# Patient Record
Sex: FEMALE | Race: WHITE | NOT HISPANIC OR LATINO | URBAN - METROPOLITAN AREA
[De-identification: names, ages, dates, MRNs, and addresses within clinical notes are randomized per-mention and may not be internally consistent; named-entity substitution may affect disease eponyms.]

---

## 2017-05-15 ENCOUNTER — OUTPATIENT (OUTPATIENT)
Dept: OUTPATIENT SERVICES | Facility: HOSPITAL | Age: 52
LOS: 1 days | Discharge: HOME | End: 2017-05-15

## 2017-06-28 DIAGNOSIS — R92.8 OTHER ABNORMAL AND INCONCLUSIVE FINDINGS ON DIAGNOSTIC IMAGING OF BREAST: ICD-10-CM

## 2017-07-25 ENCOUNTER — OUTPATIENT (OUTPATIENT)
Dept: OUTPATIENT SERVICES | Facility: HOSPITAL | Age: 52
LOS: 1 days | Discharge: HOME | End: 2017-07-25

## 2017-07-25 DIAGNOSIS — Z00.00 ENCOUNTER FOR GENERAL ADULT MEDICAL EXAMINATION WITHOUT ABNORMAL FINDINGS: ICD-10-CM

## 2018-02-03 ENCOUNTER — OUTPATIENT (OUTPATIENT)
Dept: OUTPATIENT SERVICES | Facility: HOSPITAL | Age: 53
LOS: 1 days | Discharge: HOME | End: 2018-02-03

## 2018-02-03 DIAGNOSIS — Z79.899 OTHER LONG TERM (CURRENT) DRUG THERAPY: ICD-10-CM

## 2018-08-01 ENCOUNTER — OUTPATIENT (OUTPATIENT)
Dept: OUTPATIENT SERVICES | Facility: HOSPITAL | Age: 53
LOS: 1 days | Discharge: HOME | End: 2018-08-01

## 2018-08-01 DIAGNOSIS — Z12.31 ENCOUNTER FOR SCREENING MAMMOGRAM FOR MALIGNANT NEOPLASM OF BREAST: ICD-10-CM

## 2018-08-04 ENCOUNTER — OUTPATIENT (OUTPATIENT)
Dept: OUTPATIENT SERVICES | Facility: HOSPITAL | Age: 53
LOS: 1 days | Discharge: HOME | End: 2018-08-04

## 2018-08-04 DIAGNOSIS — E11.9 TYPE 2 DIABETES MELLITUS WITHOUT COMPLICATIONS: ICD-10-CM

## 2018-08-04 DIAGNOSIS — N39.0 URINARY TRACT INFECTION, SITE NOT SPECIFIED: ICD-10-CM

## 2018-08-04 DIAGNOSIS — E53.8 DEFICIENCY OF OTHER SPECIFIED B GROUP VITAMINS: ICD-10-CM

## 2018-08-04 DIAGNOSIS — D50.9 IRON DEFICIENCY ANEMIA, UNSPECIFIED: ICD-10-CM

## 2018-08-04 DIAGNOSIS — D64.9 ANEMIA, UNSPECIFIED: ICD-10-CM

## 2018-08-04 DIAGNOSIS — E03.9 HYPOTHYROIDISM, UNSPECIFIED: ICD-10-CM

## 2018-08-04 DIAGNOSIS — E78.5 HYPERLIPIDEMIA, UNSPECIFIED: ICD-10-CM

## 2018-08-04 DIAGNOSIS — Z00.00 ENCOUNTER FOR GENERAL ADULT MEDICAL EXAMINATION WITHOUT ABNORMAL FINDINGS: ICD-10-CM

## 2018-08-04 DIAGNOSIS — E55.9 VITAMIN D DEFICIENCY, UNSPECIFIED: ICD-10-CM

## 2018-08-04 DIAGNOSIS — G40.909 EPILEPSY, UNSPECIFIED, NOT INTRACTABLE, WITHOUT STATUS EPILEPTICUS: ICD-10-CM

## 2018-08-18 ENCOUNTER — OUTPATIENT (OUTPATIENT)
Dept: OUTPATIENT SERVICES | Facility: HOSPITAL | Age: 53
LOS: 1 days | Discharge: HOME | End: 2018-08-18

## 2018-08-18 DIAGNOSIS — G40.909 EPILEPSY, UNSPECIFIED, NOT INTRACTABLE, WITHOUT STATUS EPILEPTICUS: ICD-10-CM

## 2018-08-18 NOTE — CHART NOTE - NSCHARTNOTEFT_GEN_A_CORE
Called by stat lab- phenytoin level 30.8  I spoke with Dr. Huggins who will discuss the findings with Dr. Cordova.

## 2019-01-26 ENCOUNTER — OUTPATIENT (OUTPATIENT)
Dept: OUTPATIENT SERVICES | Facility: HOSPITAL | Age: 54
LOS: 1 days | Discharge: HOME | End: 2019-01-26

## 2019-01-26 DIAGNOSIS — G40.909 EPILEPSY, UNSPECIFIED, NOT INTRACTABLE, WITHOUT STATUS EPILEPTICUS: ICD-10-CM

## 2019-07-12 ENCOUNTER — OUTPATIENT (OUTPATIENT)
Dept: OUTPATIENT SERVICES | Facility: HOSPITAL | Age: 54
LOS: 1 days | Discharge: HOME | End: 2019-07-12

## 2019-07-12 ENCOUNTER — LABORATORY RESULT (OUTPATIENT)
Age: 54
End: 2019-07-12

## 2019-07-12 DIAGNOSIS — G40.909 EPILEPSY, UNSPECIFIED, NOT INTRACTABLE, WITHOUT STATUS EPILEPTICUS: ICD-10-CM

## 2019-07-15 ENCOUNTER — APPOINTMENT (OUTPATIENT)
Dept: NEUROLOGY | Facility: CLINIC | Age: 54
End: 2019-07-15
Payer: COMMERCIAL

## 2019-07-15 VITALS
SYSTOLIC BLOOD PRESSURE: 118 MMHG | HEART RATE: 73 BPM | DIASTOLIC BLOOD PRESSURE: 69 MMHG | TEMPERATURE: 98.6 F | HEIGHT: 68 IN | BODY MASS INDEX: 20.46 KG/M2 | OXYGEN SATURATION: 99 % | WEIGHT: 135 LBS

## 2019-07-15 PROCEDURE — 99214 OFFICE O/P EST MOD 30 MIN: CPT

## 2019-07-15 NOTE — PHYSICAL EXAM
[FreeTextEntry1] : She is awake alert and oriented x3 her executive behavior is good she does have a tendency to go from one subject to another and occasionally has a slight difficulties with processing.\par She also occasionally have difficulty retrieving words.\par Cranial nerve exam is normal motor exam is normal\par Cerebellar exam is normal Romberg is negative

## 2019-07-15 NOTE — HISTORY OF PRESENT ILLNESS
[FreeTextEntry1] : Virginia he is here for followup. She was seen by me first in 2011 after she was admitted to the hospital for a period of confusion on that date and also had a witnessed generalized tonic-clonic seizure.\par She was diagnosed with epilepsy around age 11 and on and phenobarbital and Dilantin she did well for about 30 years and then her medications are being tapered off in 2011 when she had his episodes. She did have a V-EEG monitoring done that showed a right hemispheric focus she was just started on her medications from the very beginning she categorically was supposed to switch her medications and she is taking phenobarbital and Dilantin since then. Her Dilantin level despite the low dose is always about therapeutic range however she is tolerating that well without any symptoms.\par Since he is starting her medications she did not have any oth her level of energy is also good er episodes that they know of. She goes to work she drives and she says her mood and sleep are

## 2019-07-15 NOTE — ASSESSMENT
[FreeTextEntry1] : Virginia She is doing very well in regard to her seizures she is quite functional we will continue the current doses at this point she does not want to switch her medications. Her cholesterol is on the high side she is trying to modify her eating habits as well I will see her in followup.he is here for followup with the diagnosis of right hemispheric partial epilepsy since age 11

## 2019-09-09 ENCOUNTER — OUTPATIENT (OUTPATIENT)
Dept: OUTPATIENT SERVICES | Facility: HOSPITAL | Age: 54
LOS: 1 days | Discharge: HOME | End: 2019-09-09
Payer: COMMERCIAL

## 2019-09-09 DIAGNOSIS — Z12.31 ENCOUNTER FOR SCREENING MAMMOGRAM FOR MALIGNANT NEOPLASM OF BREAST: ICD-10-CM

## 2019-09-09 PROCEDURE — 77067 SCR MAMMO BI INCL CAD: CPT | Mod: 26

## 2019-09-09 PROCEDURE — 77063 BREAST TOMOSYNTHESIS BI: CPT | Mod: 26

## 2020-01-03 ENCOUNTER — RX RENEWAL (OUTPATIENT)
Age: 55
End: 2020-01-03

## 2020-01-06 ENCOUNTER — APPOINTMENT (OUTPATIENT)
Dept: NEUROLOGY | Facility: CLINIC | Age: 55
End: 2020-01-06
Payer: COMMERCIAL

## 2020-01-06 VITALS
WEIGHT: 140 LBS | DIASTOLIC BLOOD PRESSURE: 80 MMHG | SYSTOLIC BLOOD PRESSURE: 125 MMHG | TEMPERATURE: 96.3 F | HEART RATE: 75 BPM | BODY MASS INDEX: 20.73 KG/M2 | HEIGHT: 69 IN | OXYGEN SATURATION: 99 %

## 2020-01-06 PROCEDURE — 99213 OFFICE O/P EST LOW 20 MIN: CPT

## 2020-01-06 NOTE — ASSESSMENT
[FreeTextEntry1] : Virginia is here for followup she has been doing very well with her seizures. She carries a diagnosis of right hemispheric partial epilepsy. She is currently on Dilantin and phenobarbital. She always has persistent high Dilantin level as she tolerates well. She does not want to change her medications we have discussed that multiple times and we will continue at the current dose as and I will do a blood level before next visit

## 2020-01-06 NOTE — HISTORY OF PRESENT ILLNESS
[FreeTextEntry1] : virginia he is here for followup. She has been doing very well in regard to her seizure. She had did not have any episode suggestive of seizure. She is also quite happy that she made a decision to lose some weight. She says it is mainly because of the bending that she has to go.\par Overall she is quite happy functional and going to work regularly. Her sleep pattern is good and she describes her mood to be good as well.\par She has her regular followups with her primary physician. She is supposed to do a screening blood tests before next visit to include lipid profile.\par Her mammogram that was done recently was good.\par She describes her sleep pattern also to be good and waking refreshed in the morning. She says her level of energy his good. There is no headache.\par Her spine and knees are good location and in cold weather she feels a slightly achy in both knees.\par She did not have any fall.

## 2020-01-06 NOTE — PHYSICAL EXAM
[FreeTextEntry1] : She is awake alert oriented x3. Follows 3-4 step commands. Crosses the midline well.\par She is humorous and in a good mood.\par Her language is intact and memory recall is 3 out of 3.\par Cranial nerve exam is normal\par The motor exam is normal\par Cerebellar exam were normal\par Carotid exam shows no bruit\par Cardiac rhythm is normal sinus

## 2020-08-01 LAB
PHENOBARB SERPL QL: 8 UG/ML
PHENYTOIN SERPL QL: 28.6 UG/ML

## 2020-08-06 ENCOUNTER — APPOINTMENT (OUTPATIENT)
Dept: NEUROLOGY | Facility: CLINIC | Age: 55
End: 2020-08-06
Payer: COMMERCIAL

## 2020-08-06 VITALS
WEIGHT: 140 LBS | DIASTOLIC BLOOD PRESSURE: 73 MMHG | OXYGEN SATURATION: 100 % | TEMPERATURE: 97.9 F | SYSTOLIC BLOOD PRESSURE: 118 MMHG | HEIGHT: 69 IN | HEART RATE: 89 BPM | BODY MASS INDEX: 20.73 KG/M2

## 2020-08-06 LAB — PHENYTOIN FREE SERPL-MCNC: 2 MG/L

## 2020-08-06 PROCEDURE — 99214 OFFICE O/P EST MOD 30 MIN: CPT

## 2020-08-07 NOTE — PHYSICAL EXAM
[FreeTextEntry1] : A/A/Ox3\par follows 4 step commands\par In good mood\par appears slightly tired\par mildly slow with the processing and psychomotor activities\par CN- normal\par Motor----normal\par Gait--stable\par Romberg is negative

## 2020-08-07 NOTE — ASSESSMENT
[FreeTextEntry1] : Right hemispheric partial epilepsy\par Doing very well\par does not want to come off the dilantin and Phenobarb\par will continue the meds \par She will have F/U with PMD to check on her borderline high cholestrol

## 2020-08-07 NOTE — HISTORY OF PRESENT ILLNESS
[FreeTextEntry1] : Virginia is here for F/U. she is doing very well with her seizures . no events  suggestive for seizure.\par No nocturnal events. She is still not back to work.\par She says her mood  is good now , however she has had a period of feeling down and lonely and not having a reasonable human contact.\par Now made some changes and is doing better. While at home gained a good amount of weight.\par Last week decided to work on her weight. she appears very motivated. \par No other issues. Denies having back or neck pain\par No Ha. No fall\par Scheduled to see her PMD. she will do a cmplete blood test before that\par She has had her blood test done the Pb. is 8 and Dilantin level is 28\par

## 2020-09-22 ENCOUNTER — OUTPATIENT (OUTPATIENT)
Dept: OUTPATIENT SERVICES | Facility: HOSPITAL | Age: 55
LOS: 1 days | Discharge: HOME | End: 2020-09-22
Payer: COMMERCIAL

## 2020-09-22 DIAGNOSIS — Z12.31 ENCOUNTER FOR SCREENING MAMMOGRAM FOR MALIGNANT NEOPLASM OF BREAST: ICD-10-CM

## 2020-09-22 PROCEDURE — 77063 BREAST TOMOSYNTHESIS BI: CPT | Mod: 26

## 2020-09-22 PROCEDURE — 77067 SCR MAMMO BI INCL CAD: CPT | Mod: 26

## 2021-02-08 ENCOUNTER — APPOINTMENT (OUTPATIENT)
Dept: NEUROLOGY | Facility: CLINIC | Age: 56
End: 2021-02-08
Payer: COMMERCIAL

## 2021-02-08 VITALS
HEIGHT: 69 IN | OXYGEN SATURATION: 98 % | HEART RATE: 82 BPM | BODY MASS INDEX: 26.66 KG/M2 | TEMPERATURE: 96.3 F | DIASTOLIC BLOOD PRESSURE: 70 MMHG | WEIGHT: 180 LBS | SYSTOLIC BLOOD PRESSURE: 129 MMHG

## 2021-02-08 PROCEDURE — 99072 ADDL SUPL MATRL&STAF TM PHE: CPT

## 2021-02-08 PROCEDURE — 99213 OFFICE O/P EST LOW 20 MIN: CPT

## 2021-02-08 NOTE — PHYSICAL EXAM
[FreeTextEntry1] : A/A/OX3\par follows 4 step commands\par Crosses the midline well\par slight sleepy and tired\par slightly flat affect\par clearly gained weight\par normal CN\par no drift\par no dysmetria\par slight decreases dexterity and fixation of the left\par stable gait\par

## 2021-02-08 NOTE — HISTORY OF PRESENT ILLNESS
[FreeTextEntry1] : Virginia is here for the F/U. Denies having had any seizures or events suspicious for seizure.\par She looks slightly tired. she says this is because she was taking care of the snow in the drive way.. She gained about 40 pounds since last visit. she says she sleeps good. not too much??\par she denies having had neck or back pain except yesterday working outside to clean up the snow.\par Does not report having issues with her mood and memory\par No radiating pain to the arm or legs\par No vertigo\par no Ballance issues\par Did not see her PMD . is scheduled to see him in few weeks\par No blood test\par Is working from home\par planning to retire

## 2021-02-08 NOTE — ASSESSMENT
[FreeTextEntry1] : Right hemispheric partial epilepsy\par \par she is still  not eager to change her dilantin\par will do levels and also Vit D level\par will do bone density scan\par \par

## 2021-03-22 VITALS — SYSTOLIC BLOOD PRESSURE: 134 MMHG | WEIGHT: 170 LBS | BODY MASS INDEX: 25.1 KG/M2 | DIASTOLIC BLOOD PRESSURE: 76 MMHG

## 2021-08-09 ENCOUNTER — APPOINTMENT (OUTPATIENT)
Dept: NEUROLOGY | Facility: CLINIC | Age: 56
End: 2021-08-09
Payer: COMMERCIAL

## 2021-08-09 VITALS
WEIGHT: 168.25 LBS | TEMPERATURE: 97.9 F | OXYGEN SATURATION: 97 % | HEART RATE: 82 BPM | HEIGHT: 69 IN | DIASTOLIC BLOOD PRESSURE: 80 MMHG | BODY MASS INDEX: 24.92 KG/M2 | SYSTOLIC BLOOD PRESSURE: 125 MMHG

## 2021-08-09 PROCEDURE — 99213 OFFICE O/P EST LOW 20 MIN: CPT

## 2021-08-10 NOTE — PHYSICAL EXAM
[General Appearance - Alert] : alert [General Appearance - In No Acute Distress] : in no acute distress [General Appearance - Well Nourished] : well nourished [Oriented To Time, Place, And Person] : oriented to person, place, and time [Cranial Nerves Optic (II)] : visual acuity intact bilaterally,  visual fields full to confrontation, pupils equal round and reactive to light [Cranial Nerves Oculomotor (III)] : extraocular motion intact [Cranial Nerves Trigeminal (V)] : facial sensation intact symmetrically [Cranial Nerves Facial (VII)] : face symmetrical [Cranial Nerves Vestibulocochlear (VIII)] : hearing was intact bilaterally [Cranial Nerves Glossopharyngeal (IX)] : tongue and palate midline [Cranial Nerves Accessory (XI - Cranial And Spinal)] : head turning and shoulder shrug symmetric [Cranial Nerves Hypoglossal (XII)] : there was no tongue deviation with protrusion [Involuntary Movements] : no involuntary movements were seen [No Muscle Atrophy] : normal bulk in all four extremities [2+] : Ankle jerk left 2+ [Motor Strength Upper Extremities Bilaterally] : strength was normal in both upper extremities [Motor Strength Lower Extremities Bilaterally] : strength was normal in both lower extremities [Tremor] : no tremor present

## 2021-08-10 NOTE — HISTORY OF PRESENT ILLNESS
[FreeTextEntry1] : Virginia is a 56 year old female with history of right hemispheric partial epilepsy.\par Pt is doing well, remains seizure free on Phenobarbital and Phenytoin. Did not get a chance to have blood levels check. Has not seen her PMD for over a year. Trying to exercise and watching her diet. Lost 2 lbs since last visit.\par Pt is still working mostly remotely. Goes in to the office one week a month. Denies any complaints. Sleeping well. Tolerating her medications.

## 2021-08-28 ENCOUNTER — LABORATORY RESULT (OUTPATIENT)
Age: 56
End: 2021-08-28

## 2021-08-31 ENCOUNTER — TRANSCRIPTION ENCOUNTER (OUTPATIENT)
Age: 56
End: 2021-08-31

## 2021-09-23 NOTE — DISCUSSION/SUMMARY
Decrease your Lexapro to 10 mg for the next 7 days and then stop taking it.  Start taking your new medication Remeron tonight.  It may cause some increased sedation so make sure there is someone with you at night to help care for your baby.      [facility], [facility]      Celeste EnamoradoReina) Mirna, MS, LPC, ATR, LMFT, PMH-C   Reina Bradley, MS, LPC, ATR, LMFT, PMH-C is a Couples and Family Therapist who helps moms and  dads suffering from  mood disorders. She holds the  Mental Health Certification  (PMH-C) credential issued by [employer], and specializes in effective,  evidence-based treatments including CBT, EMDR, and SFBT. She takes a collaborative approach to  healing and relationships. Her practice focuses on solutions and, as a result, individuals and their families  often see improvements as quickly as 6 to 8 sessions.  In addition, Reina offers a holistic approach to mental health through the use of mindfulness, creativity,  couple's support, collaboration with other providers, and family involvement as desired.  The set of skills that sets Reina apart and compliments the evidence based treatments she provides, is her  background in Art Therapy. This creative approach is not mandatory, and is available when it's a good fit  for the client.  Reina serves on the board of directors as  for [employer] -  [place] Chapter, member of [employer] and the American [employer]  Therapy Association, has an Attachment Certificate, has training in EMDR for  Trauma, Loss,  and Recovery, and received her PMH-C in 2019. She is in private practice at [facility], located in  [place], [place], and can be found on the web at [email or web address] , or by phone at  [phone].    Best Way to Make an Appointment: Email [email or web address] or Phone call [phone]). She will get  [Safety Recommendations] : The patient was advised in regards to the risk of seizures and general seizure safety recommendations including not to be bathing alone, climbing to high places and operating heavy machinery. [Compliance with Medications] : The importance of compliance with medications was reinforced. back to moms within 1 business day (if the message sounds urgent she will call on the weekend). She does a very brief assessment over the phone, asking about previous therapy and to encourage moms and give admiration for calling.   [email or web address]     Location: Therapies [address] [address]    [facility]      Brittany Garcia, YAW, TIMOTHY, ICS   TIMOTHY Barrera LPC, ICS received her Master's Degree from [employer] and has been practicing in the field for 7 years. She has been serving as the Clinical Director for a local substance abuse treatment program for 3 years, and also practices in [place], [place] treating patients with Anxiety Disorders, Mood Disorders, Eating Disorders, and  Mood and Anxiety Disorders using evidence-based modalities, such as CBT and DBT. She also offers family and marital education and therapy. She is also certified in  Mood and Anxiety Disorders (new credential PMH-C). Mothers can bring their child(maribell) with to sessions. She provides Telehealth sessions to allow patients to seek treatment in the comfort of their own environment as an option for treatment.  Her personal and familial experiences with infertility, PMADS, and parenting, along with professional experience and training, have served Brittany with a passion to help empower and guide others to overcome barriers and achieve goals.    Best Way to Make an Appointment: Email: [email or web address] or call [phone]    Location: [address] [place] [address]    Winnie Fraser LCSW, ERIK Zhou has been a clinician for 10 years. She has experience in a broad spectrum of specialties including mood disorders, anxiety, PTSD, family counseling and substance use disorder.  Currently she is practicing in an outpatient setting. She began working with  patients after having her own child. She is trained in skilled-based  [Medication Side Effects] : High frequency and serious potential medication adverse effects were reviewed with the patient, including but not exclusive to psychiatric effects.  Information sheets on medication side effects were made available to the patient in our clinic.  The patient or advocate agrees to notify us for any concerns. methods and traditional talk therapy and emphasizes the importance of psychoeducation, validation and working to address difficult patterns of thinking and behavior. Overall, she is working to broaden access and increase awareness of PMAD within her health system. She is currently pursing PSI certification.    Best Way to Make an Appointment: Calling [phone] (there is a long wait time to receive a call back). [facility] provider can put a referral in EPIC in addition to the pt. calling the number. Must have a [facility] provide of some sort to see Winnie,     Location: [facility]: [address]  [place] [id number]    Dr. Amanda Anglin - [facility]      Amanda Anglin, PhD   Currently not accepting new patients.   Dr. Anglin provides psychotherapy for preconception, pregnancy and up to 6-months postpartum. She does donor therapy before conception with donor egg/sperm. She will see patients before IVF treatment. Dr. Anglin is a psychologist and cannot prescribe or manage medications. She providers a more traditional therapy. Telehealth and currently seeing patients in person one day a week.   Dr. Anglin also runs an 8-week mindfulness group therapy at the [facility]. To join the group, a patient must meet with Dr. Anglin once for an evaluation. The group is closed and is billed to insurance.       Best Way to Make an Appointment: Patient should call the main line or leave a voicemail - [phone].     Location: [address] [place] [address]. [place] [address]    [facility], [facility]      IVELISSE Wood MA, LPC Licensed Mental Health Counselor   Holistic Psychotherapy with IVELISSE Wood MA, YAW Licensed Mental Health Counselor    Deanna provides individual therapy, couples therapy, family therapy and group therapy.    Deanna applies a Holistic understanding of mental health to her practice. She  [Sleep Hygiene/Sleep Disruption Risks] : Sleep hygiene and the risks of sleep disruption were discussed. doesn't separate the mind and body in the usual way; but instead, she generally assumes that body and mind are dynamically interconnected and that both directly influence each other.     When appropriate, she will provide basic information on nutrition, exercise, stress management, and social and environmental determinants to health and disease.  She also counsels the person to help them deal with the emotional and mental state they are experiencing.    She provides consultations so that you can become more efficient and effective in the relationship or work situation by having a better understanding of the underlying psychological issues.    She offers a 10 minute free phone consultation to discuss services and understand what the patients needs are. She also offers a 10 minute online consultation. Some people like to experience the online format to see what it's like.     Deanna provides regular ongoing as well as a one-time supervision for mental health professionals.  Clinical Supervision provides an opportunity to review your work with clients as well as your own personal and professional growth.  Currently offering telehealth    Best Way to Make an Appointment: Phone call [phone] or email [email or web address]    Location: [address] [place] [address]  [id number]      Online Support/Peer Support   Weekly Online Support Meetings    (Variety of online support and peer support options)  http://www.postpartum.net/get-help/psi-online-support-meetings/  [employer] offers online support for  mood and anxiety disorders. Check their website for times and topics.   Join the meeting from your complete, tablet or smartphone. Get comfortable, grab a coffee cup. Listen and share your story as a mom. Meeting also offered in Bulgarian.   [email or web address]/[email or web address]  Join the moderated PSI Closed Facebook group to find support and encouragement from other parents, who may be going  [FreeTextEntry1] : Virginia is a 56 year old female with history of right hemispheric partial epilepsy doing well on current medications and does not want to switch,\par Plan:\par - WIll continue same doses of mecidations for no\par - Rx given for blood work\par - Bone density scan\par - Follow up in 6-7 months\par \par Case discussed with Dr. Cordova\par \par Ayala Fraser, DNP, ACNP-BC through similar experiences.    Best Way to Make an Appointment: If you would like to attend  online meetings for the first time, please create an account. It takes just a minute to complete and is free: [email or web address]    Location:   ALL - Online    PSI [place] Chapter (if patient needs to contact PSI by telephone)  [place] is the [facility] [place] [facility] chapter of [employer]partum [employer] (PSI), the world’s leading non-profit organization dedicated to helping those suffering from  mood disorders, the most common complication of childbirth. Our mission is to promote awareness, education, prevention, and treatment of  mental health issues affecting mothers, their families, and support systems in all areas of [place].  Best way to make an appointment [email or web address]  [phone] English and Portuguese   Location: [facility]     Moms Mental Health Initiative ([facility])        Moms Mental Health Initiative (momsmentalhealthmke.org) is a non-profit organization serving moms in the [facility] area and surrounding areas. They are dedicated to helping moms navigate  mood and anxiety disorders (PMADs) by sharing information, connecting them to resources and providing peer-driven support. They are the only organization in [place] that:  •      Offers a closed, private Facebook group for those who are pregnant or up to 1 year postpartum called [place] [facility]. Moms meeting the group's criteria can be added by [facility] staff. This group serves to connect moms to other moms who are suffering as well as moms who have survived various PMADs.   •            Has a network of preferred providers and is able to connect moms to the best and most compassionate care in her community.  •   Is an expert on PMADs from the mom's perspective and how best to advocate for moms who are  suffering.  •        Hosts monthly, in-person gatherings for additional support.     Best Way to Make an Appointment: E-mail: [email or web address] or Facebook: [email or web address].[email or web address]     Location: Online    [facility]        [facility] Virtual Support Groups are moderated by a team of postpartum advocates who have come together to help YOU - the new or expecting mom  who may be currently suffering from a  mood disorder, like depression or anxiety; or a mom who is simply scared and stressed in times like these. Our sxgr-nj-ndwq virtual support groups bring moms together on a Zoom call with our leaders - most of whom are survivors of postpartum depression or anxiety themselves.          On the call, you'll have the chance to talk, listen, discover coping techniques, and develop a mindfulness practice - all with the support of other moms just like you. It's free to join   [email or web address]    Best Way to Make an Appointment: [email or web address]    Location: Online

## 2022-01-03 ENCOUNTER — RX RENEWAL (OUTPATIENT)
Age: 57
End: 2022-01-03

## 2022-02-07 ENCOUNTER — APPOINTMENT (OUTPATIENT)
Dept: NEUROLOGY | Facility: CLINIC | Age: 57
End: 2022-02-07
Payer: COMMERCIAL

## 2022-02-07 VITALS
WEIGHT: 167 LBS | BODY MASS INDEX: 24.73 KG/M2 | OXYGEN SATURATION: 99 % | TEMPERATURE: 97.6 F | SYSTOLIC BLOOD PRESSURE: 119 MMHG | HEIGHT: 69 IN | DIASTOLIC BLOOD PRESSURE: 79 MMHG | HEART RATE: 80 BPM

## 2022-02-07 PROCEDURE — 99213 OFFICE O/P EST LOW 20 MIN: CPT

## 2022-02-07 NOTE — ASSESSMENT
[FreeTextEntry1] : `-Right  partial epilepsy\par \par - DLD\par \par \par plan\par continue currenrt\par F/U with level

## 2022-02-07 NOTE — PHYSICAL EXAM
[FreeTextEntry1] : A/A/Ox3\par good attention\par normal language\par follows 4 step commands\par CN- intact\par motor- normal\par able to stand up without using the hand\par steady gait\par

## 2022-02-07 NOTE — HISTORY OF PRESENT ILLNESS
[FreeTextEntry1] : Virginia is here for the F/U. She is at her baseline . has had no seizures and no events suggestive of seizure.\par She did have a fall after last visit. It was slippery road and falling down she did have fracture of the left foot.\par She did have a prolong recovery time and now is back to her baseline.\par She is sure that she did not have a seizure prior to the event.\par She did have blood test done that shows a phenobarb level of 6.6 and DPH of 28.9 ( same pattern as always. )\par Her Vit D level is 32 her lipid profile is better but still showing slightly high total and LDL levels\par She says she is vaccinated X2 and is ging to work.\par memory and sleep are at the baseline\par No knee, hip or back pain .\par no change in the mood

## 2022-03-23 ENCOUNTER — NON-APPOINTMENT (OUTPATIENT)
Age: 57
End: 2022-03-23

## 2022-03-23 DIAGNOSIS — Z83.3 FAMILY HISTORY OF DIABETES MELLITUS: ICD-10-CM

## 2022-03-23 DIAGNOSIS — Z87.898 PERSONAL HISTORY OF OTHER SPECIFIED CONDITIONS: ICD-10-CM

## 2022-03-23 DIAGNOSIS — Z87.440 PERSONAL HISTORY OF URINARY (TRACT) INFECTIONS: ICD-10-CM

## 2022-03-23 DIAGNOSIS — Z86.69 PERSONAL HISTORY OF OTHER DISEASES OF THE NERVOUS SYSTEM AND SENSE ORGANS: ICD-10-CM

## 2022-03-23 DIAGNOSIS — Z80.3 FAMILY HISTORY OF MALIGNANT NEOPLASM OF BREAST: ICD-10-CM

## 2022-03-23 DIAGNOSIS — Z92.89 PERSONAL HISTORY OF OTHER MEDICAL TREATMENT: ICD-10-CM

## 2022-03-23 DIAGNOSIS — Z87.42 PERSONAL HISTORY OF OTHER DISEASES OF THE FEMALE GENITAL TRACT: ICD-10-CM

## 2022-03-23 DIAGNOSIS — Z78.9 OTHER SPECIFIED HEALTH STATUS: ICD-10-CM

## 2022-03-23 DIAGNOSIS — Z78.0 ASYMPTOMATIC MENOPAUSAL STATE: ICD-10-CM

## 2022-03-28 ENCOUNTER — RX RENEWAL (OUTPATIENT)
Age: 57
End: 2022-03-28

## 2022-04-04 PROBLEM — Z00.00 ENCOUNTER FOR PREVENTIVE HEALTH EXAMINATION: Noted: 2022-04-04

## 2022-04-07 ENCOUNTER — APPOINTMENT (OUTPATIENT)
Dept: OBGYN | Facility: CLINIC | Age: 57
End: 2022-04-07
Payer: COMMERCIAL

## 2022-04-07 VITALS
BODY MASS INDEX: 24.73 KG/M2 | SYSTOLIC BLOOD PRESSURE: 120 MMHG | WEIGHT: 167 LBS | HEIGHT: 69 IN | DIASTOLIC BLOOD PRESSURE: 80 MMHG

## 2022-04-07 DIAGNOSIS — Z00.00 ENCOUNTER FOR GENERAL ADULT MEDICAL EXAMINATION W/OUT ABNORMAL FINDINGS: ICD-10-CM

## 2022-04-07 LAB
BILIRUB UR QL STRIP: NORMAL
CLARITY UR: CLEAR
COLLECTION METHOD: NORMAL
GLUCOSE UR-MCNC: NORMAL
HCG UR QL: 0.2 EU/DL
HGB UR QL STRIP.AUTO: NORMAL
KETONES UR-MCNC: NORMAL
LEUKOCYTE ESTERASE UR QL STRIP: NORMAL
NITRITE UR QL STRIP: NORMAL
PH UR STRIP: 5.5
PROT UR STRIP-MCNC: NORMAL
SP GR UR STRIP: 1.02

## 2022-04-07 PROCEDURE — 99396 PREV VISIT EST AGE 40-64: CPT

## 2022-04-07 PROCEDURE — 81003 URINALYSIS AUTO W/O SCOPE: CPT | Mod: QW

## 2022-04-13 LAB — HPV HIGH+LOW RISK DNA PNL CVX: NOT DETECTED

## 2022-04-18 LAB — CYTOLOGY CVX/VAG DOC THIN PREP: ABNORMAL

## 2022-05-02 ENCOUNTER — RESULT REVIEW (OUTPATIENT)
Age: 57
End: 2022-05-02

## 2022-05-02 ENCOUNTER — OUTPATIENT (OUTPATIENT)
Dept: OUTPATIENT SERVICES | Facility: HOSPITAL | Age: 57
LOS: 1 days | Discharge: HOME | End: 2022-05-02
Payer: COMMERCIAL

## 2022-05-02 DIAGNOSIS — Z12.31 ENCOUNTER FOR SCREENING MAMMOGRAM FOR MALIGNANT NEOPLASM OF BREAST: ICD-10-CM

## 2022-05-02 PROCEDURE — 77063 BREAST TOMOSYNTHESIS BI: CPT | Mod: 26

## 2022-05-02 PROCEDURE — 77067 SCR MAMMO BI INCL CAD: CPT | Mod: 26

## 2022-05-04 DIAGNOSIS — R92.8 OTHER ABNORMAL AND INCONCLUSIVE FINDINGS ON DIAGNOSTIC IMAGING OF BREAST: ICD-10-CM

## 2022-05-10 ENCOUNTER — RESULT REVIEW (OUTPATIENT)
Age: 57
End: 2022-05-10

## 2022-05-10 ENCOUNTER — OUTPATIENT (OUTPATIENT)
Dept: OUTPATIENT SERVICES | Facility: HOSPITAL | Age: 57
LOS: 1 days | Discharge: HOME | End: 2022-05-10
Payer: COMMERCIAL

## 2022-05-10 DIAGNOSIS — R92.8 OTHER ABNORMAL AND INCONCLUSIVE FINDINGS ON DIAGNOSTIC IMAGING OF BREAST: ICD-10-CM

## 2022-05-10 PROCEDURE — 77065 DX MAMMO INCL CAD UNI: CPT | Mod: 26,LT

## 2022-05-10 PROCEDURE — G0279: CPT | Mod: 26

## 2022-05-10 PROCEDURE — 77061 BREAST TOMOSYNTHESIS UNI: CPT | Mod: 26

## 2022-05-10 PROCEDURE — 76642 ULTRASOUND BREAST LIMITED: CPT | Mod: 26,LT

## 2022-08-11 ENCOUNTER — APPOINTMENT (OUTPATIENT)
Dept: NEUROLOGY | Facility: CLINIC | Age: 57
End: 2022-08-11

## 2022-08-11 ENCOUNTER — RESULT REVIEW (OUTPATIENT)
Age: 57
End: 2022-08-11

## 2022-08-11 VITALS
BODY MASS INDEX: 23.4 KG/M2 | TEMPERATURE: 97.6 F | SYSTOLIC BLOOD PRESSURE: 118 MMHG | DIASTOLIC BLOOD PRESSURE: 67 MMHG | HEIGHT: 69 IN | WEIGHT: 158 LBS | OXYGEN SATURATION: 99 % | HEART RATE: 74 BPM

## 2022-08-11 PROCEDURE — 99213 OFFICE O/P EST LOW 20 MIN: CPT

## 2022-08-12 NOTE — HISTORY OF PRESENT ILLNESS
[FreeTextEntry1] : Virginia is here for the F/U.\par She had lost some weight. Doing well is not reporting any event. Is happy and going to work everyday and is also socially active.\par She is also exercising regularly\par denies having spinal and joint pain\par  She is due to visit her PMD.\par She did not do a blood test yet and also the order for her bone density scan is overdue\par No nocturnal events.\par Reports having a restful sleep\par She denies having any side effects from the meds considering the weight loss

## 2022-08-12 NOTE — ASSESSMENT
[FreeTextEntry1] : Right partial epilepsy\par mild DLD\par \par plan\par levels and lipid profile\par bone density scan\par discussed the possibility of switching the AED . For the first time she appears to be agreeable\par will discuss that after having the results

## 2022-08-12 NOTE — PHYSICAL EXAM
[FreeTextEntry1] : A/A/Ox3\par good mood\par good attention\par occasionally showing slight difficulty with retrieving words( baseline)\par Normal CN\par nom drift\par no dysmetria\par stable gait\par negative Romberg

## 2022-08-13 LAB
25(OH)D3 SERPL-MCNC: 39 NG/ML
ALBUMIN SERPL ELPH-MCNC: 4.9 G/DL
ALP BLD-CCNC: 142 U/L
ALT SERPL-CCNC: 17 U/L
ANION GAP SERPL CALC-SCNC: 11 MMOL/L
AST SERPL-CCNC: 31 U/L
BASOPHILS # BLD AUTO: 0.04 K/UL
BASOPHILS NFR BLD AUTO: 0.6 %
BILIRUB SERPL-MCNC: 0.3 MG/DL
BUN SERPL-MCNC: 20 MG/DL
CALCIUM SERPL-MCNC: 9.4 MG/DL
CHLORIDE SERPL-SCNC: 100 MMOL/L
CHOLEST SERPL-MCNC: 246 MG/DL
CO2 SERPL-SCNC: 27 MMOL/L
CREAT SERPL-MCNC: 0.7 MG/DL
EGFR: 100 ML/MIN/1.73M2
EOSINOPHIL # BLD AUTO: 0.13 K/UL
EOSINOPHIL NFR BLD AUTO: 2.1 %
GLUCOSE SERPL-MCNC: 103 MG/DL
HCT VFR BLD CALC: 44.9 %
HDLC SERPL-MCNC: 99 MG/DL
HGB BLD-MCNC: 14 G/DL
IMM GRANULOCYTES NFR BLD AUTO: 0.2 %
LDLC SERPL CALC-MCNC: 132 MG/DL
LYMPHOCYTES # BLD AUTO: 2.01 K/UL
LYMPHOCYTES NFR BLD AUTO: 32 %
MAN DIFF?: NORMAL
MCHC RBC-ENTMCNC: 29.4 PG
MCHC RBC-ENTMCNC: 31.2 G/DL
MCV RBC AUTO: 94.3 FL
MONOCYTES # BLD AUTO: 0.54 K/UL
MONOCYTES NFR BLD AUTO: 8.6 %
NEUTROPHILS # BLD AUTO: 3.55 K/UL
NEUTROPHILS NFR BLD AUTO: 56.5 %
NONHDLC SERPL-MCNC: 147 MG/DL
PHENOBARB SERPL QL: 6 UG/ML
PLATELET # BLD AUTO: 254 K/UL
POTASSIUM SERPL-SCNC: NORMAL MMOL/L
PROT SERPL-MCNC: 7 G/DL
RBC # BLD: 4.76 M/UL
RBC # FLD: 13.6 %
SODIUM SERPL-SCNC: 138 MMOL/L
TRIGL SERPL-MCNC: 69 MG/DL
WBC # FLD AUTO: 6.28 K/UL

## 2022-08-16 LAB — PHENYTOIN FREE SERPL-MCNC: 2.2 MG/L

## 2022-09-10 ENCOUNTER — LABORATORY RESULT (OUTPATIENT)
Age: 57
End: 2022-09-10

## 2022-09-19 ENCOUNTER — OUTPATIENT (OUTPATIENT)
Dept: OUTPATIENT SERVICES | Facility: HOSPITAL | Age: 57
LOS: 1 days | Discharge: HOME | End: 2022-09-19

## 2022-09-20 DIAGNOSIS — Z78.0 ASYMPTOMATIC MENOPAUSAL STATE: ICD-10-CM

## 2022-09-20 DIAGNOSIS — Z87.310 PERSONAL HISTORY OF (HEALED) OSTEOPOROSIS FRACTURE: ICD-10-CM

## 2022-09-20 DIAGNOSIS — Z13.820 ENCOUNTER FOR SCREENING FOR OSTEOPOROSIS: ICD-10-CM

## 2022-09-20 DIAGNOSIS — M81.0 AGE-RELATED OSTEOPOROSIS WITHOUT CURRENT PATHOLOGICAL FRACTURE: ICD-10-CM

## 2022-11-11 ENCOUNTER — OUTPATIENT (OUTPATIENT)
Dept: OUTPATIENT SERVICES | Facility: HOSPITAL | Age: 57
LOS: 1 days | Discharge: HOME | End: 2022-11-11

## 2022-11-11 DIAGNOSIS — R92.8 OTHER ABNORMAL AND INCONCLUSIVE FINDINGS ON DIAGNOSTIC IMAGING OF BREAST: ICD-10-CM

## 2022-11-11 PROCEDURE — 77065 DX MAMMO INCL CAD UNI: CPT | Mod: 26,LT

## 2022-11-11 PROCEDURE — 76642 ULTRASOUND BREAST LIMITED: CPT | Mod: 26,LT

## 2022-11-11 PROCEDURE — G0279: CPT | Mod: 26

## 2023-03-01 ENCOUNTER — RX RENEWAL (OUTPATIENT)
Age: 58
End: 2023-03-01

## 2023-03-02 ENCOUNTER — NON-APPOINTMENT (OUTPATIENT)
Age: 58
End: 2023-03-02

## 2023-03-02 ENCOUNTER — APPOINTMENT (OUTPATIENT)
Dept: NEUROLOGY | Facility: CLINIC | Age: 58
End: 2023-03-02
Payer: COMMERCIAL

## 2023-03-02 VITALS
BODY MASS INDEX: 20.73 KG/M2 | HEIGHT: 69 IN | SYSTOLIC BLOOD PRESSURE: 116 MMHG | OXYGEN SATURATION: 96 % | HEART RATE: 85 BPM | DIASTOLIC BLOOD PRESSURE: 68 MMHG | TEMPERATURE: 98.6 F | WEIGHT: 140 LBS

## 2023-03-02 PROCEDURE — 99213 OFFICE O/P EST LOW 20 MIN: CPT

## 2023-03-02 NOTE — HISTORY OF PRESENT ILLNESS
[FreeTextEntry1] : Virginia is here for the F/u\par She lost another 18 pounds since her visit 6 months ago.she says she is doing it mainly by portion control\par The last blood test that she did shows a drop in the TG , however the Cholesterol is still high 246 and the LDL is 147\par she is also walking a lot and doing exercise\par No aches and pains No fever, rash, or recent illness.  No joint pain/swelling/stiffness.  No eye pain/redness/change in vision.  No sores in the mouth or nose.  No difficulty swallowing.  No chest pain or shortness of breath.  No abdominal complaints or weight loss.  No weakness.  No headaches or focal neurological deficits.  No urinary changes.  No other new symptoms. joint pain\par No vertigo\par No dizziness\par describes her mood and sleep as good\par saw her PMD today\par

## 2023-03-02 NOTE — ASSESSMENT
[FreeTextEntry1] : Mild DLD\par right partial epilepsy\par \par \par Plan\par does not want to change her meds or to simplify them\par will start lipitor \par f/u with labs

## 2023-03-02 NOTE — PHYSICAL EXAM
[FreeTextEntry1] : A/A/Ox3\par good mood\par good attention\par CN- normal\par No drift\par No dysmetria\par stable gait\par Negative Romberg\par good ROM of the neck\par

## 2023-04-26 ENCOUNTER — LABORATORY RESULT (OUTPATIENT)
Age: 58
End: 2023-04-26

## 2023-04-27 ENCOUNTER — APPOINTMENT (OUTPATIENT)
Dept: OBGYN | Facility: CLINIC | Age: 58
End: 2023-04-27
Payer: COMMERCIAL

## 2023-04-27 VITALS
HEART RATE: 80 BPM | SYSTOLIC BLOOD PRESSURE: 120 MMHG | WEIGHT: 139 LBS | HEIGHT: 69 IN | BODY MASS INDEX: 20.59 KG/M2 | DIASTOLIC BLOOD PRESSURE: 71 MMHG

## 2023-04-27 DIAGNOSIS — Z12.31 ENCOUNTER FOR SCREENING MAMMOGRAM FOR MALIGNANT NEOPLASM OF BREAST: ICD-10-CM

## 2023-04-27 PROCEDURE — 99396 PREV VISIT EST AGE 40-64: CPT

## 2023-04-27 PROCEDURE — 81003 URINALYSIS AUTO W/O SCOPE: CPT | Mod: QW

## 2023-04-27 RX ORDER — NITROFURANTOIN (MONOHYDRATE/MACROCRYSTALS) 25; 75 MG/1; MG/1
100 CAPSULE ORAL
Qty: 14 | Refills: 2 | Status: ACTIVE | COMMUNITY
Start: 2023-04-27 | End: 1900-01-01

## 2023-05-01 ENCOUNTER — RX RENEWAL (OUTPATIENT)
Age: 58
End: 2023-05-01

## 2023-05-01 LAB
APPEARANCE: CLEAR
BILIRUBIN URINE: NEGATIVE
BLOOD URINE: NEGATIVE
COLOR: YELLOW
GLUCOSE QUALITATIVE U: NEGATIVE MG/DL
HPV HIGH+LOW RISK DNA PNL CVX: NOT DETECTED
KETONES URINE: NEGATIVE MG/DL
LEUKOCYTE ESTERASE URINE: ABNORMAL
NITRITE URINE: POSITIVE
PH URINE: 6
PROTEIN URINE: NEGATIVE MG/DL
SPECIFIC GRAVITY URINE: 1.02
UROBILINOGEN URINE: 0.2 MG/DL

## 2023-05-02 LAB
BACTERIA UR CULT: ABNORMAL
CYTOLOGY CVX/VAG DOC THIN PREP: ABNORMAL

## 2023-05-06 LAB
ALBUMIN SERPL ELPH-MCNC: 4.5 G/DL
ALP BLD-CCNC: 131 U/L
ALT SERPL-CCNC: 22 U/L
ANION GAP SERPL CALC-SCNC: 12 MMOL/L
AST SERPL-CCNC: 23 U/L
BILIRUB SERPL-MCNC: 0.3 MG/DL
BUN SERPL-MCNC: 22 MG/DL
CALCIUM SERPL-MCNC: 9.8 MG/DL
CHLORIDE SERPL-SCNC: 104 MMOL/L
CHOLEST SERPL-MCNC: 199 MG/DL
CO2 SERPL-SCNC: 27 MMOL/L
CREAT SERPL-MCNC: 0.7 MG/DL
EGFR: 100 ML/MIN/1.73M2
GLUCOSE SERPL-MCNC: 97 MG/DL
HDLC SERPL-MCNC: 124 MG/DL
LDLC SERPL CALC-MCNC: 69 MG/DL
NONHDLC SERPL-MCNC: 75 MG/DL
PHENOBARB SERPL QL: 6.5 UG/ML
POTASSIUM SERPL-SCNC: 5.2 MMOL/L
PROT SERPL-MCNC: 6.7 G/DL
SODIUM SERPL-SCNC: 143 MMOL/L
TRIGL SERPL-MCNC: 54 MG/DL

## 2023-05-11 LAB — PHENYTOIN FREE SERPL-MCNC: 2.2 MG/L

## 2023-05-15 ENCOUNTER — OUTPATIENT (OUTPATIENT)
Dept: OUTPATIENT SERVICES | Facility: HOSPITAL | Age: 58
LOS: 1 days | End: 2023-05-15
Payer: COMMERCIAL

## 2023-05-15 DIAGNOSIS — Z00.8 ENCOUNTER FOR OTHER GENERAL EXAMINATION: ICD-10-CM

## 2023-05-15 DIAGNOSIS — R92.8 OTHER ABNORMAL AND INCONCLUSIVE FINDINGS ON DIAGNOSTIC IMAGING OF BREAST: ICD-10-CM

## 2023-05-15 PROCEDURE — 77066 DX MAMMO INCL CAD BI: CPT

## 2023-05-15 PROCEDURE — G0279: CPT | Mod: 26

## 2023-05-15 PROCEDURE — 76641 ULTRASOUND BREAST COMPLETE: CPT | Mod: 50

## 2023-05-15 PROCEDURE — 77066 DX MAMMO INCL CAD BI: CPT | Mod: 26

## 2023-05-15 PROCEDURE — 76641 ULTRASOUND BREAST COMPLETE: CPT | Mod: 26,50

## 2023-05-15 PROCEDURE — G0279: CPT

## 2023-05-16 DIAGNOSIS — R92.8 OTHER ABNORMAL AND INCONCLUSIVE FINDINGS ON DIAGNOSTIC IMAGING OF BREAST: ICD-10-CM

## 2023-05-31 ENCOUNTER — RX RENEWAL (OUTPATIENT)
Age: 58
End: 2023-05-31

## 2023-06-28 ENCOUNTER — RX RENEWAL (OUTPATIENT)
Age: 58
End: 2023-06-28

## 2023-07-28 ENCOUNTER — RX RENEWAL (OUTPATIENT)
Age: 58
End: 2023-07-28

## 2023-07-31 ENCOUNTER — RX RENEWAL (OUTPATIENT)
Age: 58
End: 2023-07-31

## 2023-08-05 ENCOUNTER — NON-APPOINTMENT (OUTPATIENT)
Age: 58
End: 2023-08-05

## 2023-08-07 ENCOUNTER — APPOINTMENT (OUTPATIENT)
Dept: ORTHOPEDIC SURGERY | Facility: CLINIC | Age: 58
End: 2023-08-07
Payer: COMMERCIAL

## 2023-08-07 PROCEDURE — 99203 OFFICE O/P NEW LOW 30 MIN: CPT

## 2023-08-07 PROCEDURE — 99213 OFFICE O/P EST LOW 20 MIN: CPT

## 2023-08-11 ENCOUNTER — NON-APPOINTMENT (OUTPATIENT)
Age: 58
End: 2023-08-11

## 2023-08-11 NOTE — DATA REVIEWED
[FreeTextEntry1] : X-ray [facility]:Acute small avulsion fracture from anterolateral calcaneus at extensor digitorum brevis tendon attachment site. Chronic appearing small ossicle adjacent to distal tibial posterior malleolar margin on lateral view. No dislocations or additional fractures.  Congruent ankle mortise with smooth intact talar dome. Preserved remaining visualized midfoot and hindfoot joint spaces and no joint margin erosions.  No calcaneal spurring and unremarkable distal Achilles tendon shadow.  No discrete lytic or blastic lesions.

## 2023-08-11 NOTE — HISTORY OF PRESENT ILLNESS
[de-identified] : Patient is a 58-year-old female here for evaluation of right ankle injury.  Patient states on 8/6/2023 she was getting up from a chair and twisted her right ankle/foot.  Patient was in immediate pain and went to urgent care for evaluation, x-rays were taken and patient was told she had a avulsion fracture of the foot/ankle area.  Patient has pain with ambulation.  Denies numbness/tingling.

## 2023-08-11 NOTE — PHYSICAL EXAM
[Right] : right foot and ankle [] : negative Posadas test [FreeTextEntry8] : Pain to palpation anterior lateral aspect of calcaneus/lateral aspect foot.  No tenderness to ankle/calf [FreeTextEntry9] : Good range of motion with pain [de-identified] : Good strength with pain

## 2023-08-11 NOTE — DISCUSSION/SUMMARY
[de-identified] : Discussed with patient detail that she is avulsion fracture from the anterior lateral aspect of calcaneus.  These fractures generally heal well on their own with conservative treatment.  This patient short cam walker boot weightbearing as tolerated.  Advised ice/heat, warm compresses, warm water Epsom salt soaks, Tylenol.  Advised no heavy lifting, pushing, pulling.  Patient will refrain from work for 1 week for rest.  Call with any questions or concerns.  Patient understands agrees with plan.

## 2023-08-18 ENCOUNTER — APPOINTMENT (OUTPATIENT)
Dept: ORTHOPEDIC SURGERY | Facility: CLINIC | Age: 58
End: 2023-08-18
Payer: COMMERCIAL

## 2023-08-18 DIAGNOSIS — S99.921A UNSPECIFIED INJURY OF RIGHT FOOT, INITIAL ENCOUNTER: ICD-10-CM

## 2023-08-18 PROCEDURE — 99213 OFFICE O/P EST LOW 20 MIN: CPT

## 2023-08-18 PROCEDURE — 99203 OFFICE O/P NEW LOW 30 MIN: CPT

## 2023-08-18 NOTE — DISCUSSION/SUMMARY
[de-identified] :   I discussed the patient's findings with her.  At this time I would like to her to discontinue the boot.  She has no restrictions my standpoint.  She can return to work next Monday.  I will see her back on as-needed basis.  All questions answered.

## 2023-08-18 NOTE — DATA REVIEWED
[FreeTextEntry1] :   I reviewed the x-rays from her last visit.  No fracture or dislocation is seen.

## 2023-08-18 NOTE — PHYSICAL EXAM
[de-identified] :   Examination of her right lower extremity undertaken.  Her skin is intact.  She is nonswollen.  There is no malalignment.  She is nontender throughout the entire foot.  Full range of motion.  Compartments are soft compressible.  She is neurovascularly intact distally.

## 2023-08-18 NOTE — HISTORY OF PRESENT ILLNESS
[de-identified] :  49-year-old patient presents to me several weeks after injuring her right foot.  She was seen by one of our physician assistants who placed her into a boot.  The pain has been localized to the midportion of the foot but today she reports no pain.  She is worn weight on it without her boot and that causes no pain as well.

## 2023-09-20 ENCOUNTER — APPOINTMENT (OUTPATIENT)
Dept: NEUROLOGY | Facility: CLINIC | Age: 58
End: 2023-09-20
Payer: COMMERCIAL

## 2023-09-20 VITALS
DIASTOLIC BLOOD PRESSURE: 66 MMHG | OXYGEN SATURATION: 99 % | WEIGHT: 138 LBS | SYSTOLIC BLOOD PRESSURE: 118 MMHG | HEART RATE: 69 BPM | TEMPERATURE: 97.5 F | HEIGHT: 69 IN | BODY MASS INDEX: 20.44 KG/M2

## 2023-09-20 PROCEDURE — 99214 OFFICE O/P EST MOD 30 MIN: CPT

## 2023-09-21 RX ORDER — PHENYTOIN 50 MG/1
50 TABLET, CHEWABLE ORAL
Qty: 30 | Refills: 5 | Status: ACTIVE | COMMUNITY
Start: 2022-01-03 | End: 1900-01-01

## 2023-11-15 ENCOUNTER — OUTPATIENT (OUTPATIENT)
Dept: OUTPATIENT SERVICES | Facility: HOSPITAL | Age: 58
LOS: 1 days | End: 2023-11-15
Payer: COMMERCIAL

## 2023-11-15 DIAGNOSIS — R92.8 OTHER ABNORMAL AND INCONCLUSIVE FINDINGS ON DIAGNOSTIC IMAGING OF BREAST: ICD-10-CM

## 2023-11-15 PROCEDURE — 76642 ULTRASOUND BREAST LIMITED: CPT | Mod: 26,LT

## 2023-11-15 PROCEDURE — 77065 DX MAMMO INCL CAD UNI: CPT | Mod: 26,LT

## 2023-11-15 PROCEDURE — G0279: CPT

## 2023-11-15 PROCEDURE — 76642 ULTRASOUND BREAST LIMITED: CPT | Mod: LT

## 2023-11-15 PROCEDURE — G0279: CPT | Mod: 26

## 2023-11-15 PROCEDURE — 77065 DX MAMMO INCL CAD UNI: CPT | Mod: LT

## 2023-11-16 DIAGNOSIS — R92.8 OTHER ABNORMAL AND INCONCLUSIVE FINDINGS ON DIAGNOSTIC IMAGING OF BREAST: ICD-10-CM

## 2024-03-04 ENCOUNTER — APPOINTMENT (OUTPATIENT)
Dept: NEUROLOGY | Facility: CLINIC | Age: 59
End: 2024-03-04
Payer: COMMERCIAL

## 2024-03-04 VITALS
HEART RATE: 88 BPM | WEIGHT: 144.25 LBS | SYSTOLIC BLOOD PRESSURE: 122 MMHG | BODY MASS INDEX: 21.36 KG/M2 | RESPIRATION RATE: 19 BRPM | TEMPERATURE: 97.8 F | OXYGEN SATURATION: 99 % | DIASTOLIC BLOOD PRESSURE: 77 MMHG | HEIGHT: 69 IN

## 2024-03-04 DIAGNOSIS — G40.109 LOCALIZATION-RELATED (FOCAL) (PARTIAL) SYMPTOMATIC EPILEPSY AND EPILEPTIC SYNDROMES WITH SIMPLE PARTIAL SEIZURES, NOT INTRACTABLE, W/OUT STATUS EPILEPTICUS: ICD-10-CM

## 2024-03-04 LAB
25(OH)D3 SERPL-MCNC: 44 NG/ML
ALBUMIN SERPL ELPH-MCNC: 4.7 G/DL
ALP BLD-CCNC: 129 U/L
ALT SERPL-CCNC: 16 U/L
AMYLASE/CREAT SERPL: 26 U/L
ANION GAP SERPL CALC-SCNC: 14 MMOL/L
AST SERPL-CCNC: 21 U/L
BILIRUB SERPL-MCNC: 0.2 MG/DL
BUN SERPL-MCNC: 23 MG/DL
CALCIUM SERPL-MCNC: 9.1 MG/DL
CHLORIDE SERPL-SCNC: 100 MMOL/L
CO2 SERPL-SCNC: 25 MMOL/L
CREAT SERPL-MCNC: 0.7 MG/DL
EGFR: 100 ML/MIN/1.73M2
GLUCOSE SERPL-MCNC: 111 MG/DL
HCT VFR BLD CALC: 42.1 %
HGB BLD-MCNC: 13.6 G/DL
LPL SERPL-CCNC: 17 U/L
MCHC RBC-ENTMCNC: 30.4 PG
MCHC RBC-ENTMCNC: 32.3 G/DL
MCV RBC AUTO: 94.2 FL
PHENOBARB SERPL QL: 6.8 UG/ML
PHENYTOIN SERPL QL: 38.9 UG/ML
PLATELET # BLD AUTO: 254 K/UL
PMV BLD AUTO: 0 /100 WBCS
PMV BLD: 10.8 FL
POTASSIUM SERPL-SCNC: 4.9 MMOL/L
PROT SERPL-MCNC: 6.9 G/DL
RBC # BLD: 4.47 M/UL
RBC # FLD: 13.7 %
SODIUM SERPL-SCNC: 139 MMOL/L
WBC # FLD AUTO: 6.22 K/UL

## 2024-03-04 PROCEDURE — 99214 OFFICE O/P EST MOD 30 MIN: CPT

## 2024-03-04 RX ORDER — PHENYTOIN SODIUM 100 MG/1
100 CAPSULE ORAL
Qty: 60 | Refills: 5 | Status: ACTIVE | COMMUNITY
Start: 2022-01-03 | End: 1900-01-01

## 2024-03-04 RX ORDER — PHENYTOIN 50 MG/1
50 TABLET, CHEWABLE ORAL
Qty: 30 | Refills: 6 | Status: DISCONTINUED | COMMUNITY
End: 2024-03-04

## 2024-03-05 NOTE — HISTORY OF PRESENT ILLNESS
[FreeTextEntry1] : [place] is here for the F/U She denies having had any seizures or events suggestive of Sz. She did have a blood level done that shows a Dilantin level of 38 She denies having any symptoms No issues with her balance and her vison Her levels are usually running high. but not this high. She lost some weight. The [place] level was 6.8 She says she is very happy after the [facility]DC. Keeping herself busy Spending majority of her time with her sister her cholesterol , now that she lost weight is good. her mood and sleep are good   .

## 2024-03-05 NOTE — PHYSICAL EXAM
[FreeTextEntry1] : A/A/O x3 good mood good attention slight psychomotor slowing Cn- normal no nystagmus No drift no dysmetria stable gait negative Romberg

## 2024-03-05 NOTE — ASSESSMENT
[FreeTextEntry1] : 1- partial epilepsy 2-DLD  plan change the 50 mg am dose of the Dilantin to every other day again discussed in length the other AED and options I have given her 3 names, Keppra, Vimpat and Trileptal to discuss it when and if  she is ready to change level

## 2024-03-11 ENCOUNTER — RX RENEWAL (OUTPATIENT)
Age: 59
End: 2024-03-11

## 2024-03-11 RX ORDER — PHENOBARBITAL 15 MG/1
15 TABLET ORAL
Qty: 60 | Refills: 5 | Status: ACTIVE | COMMUNITY
Start: 1900-01-01 | End: 1900-01-01

## 2024-05-08 RX ORDER — ATORVASTATIN CALCIUM 10 MG/1
10 TABLET, FILM COATED ORAL
Qty: 90 | Refills: 1 | Status: ACTIVE | COMMUNITY
Start: 2023-03-02 | End: 1900-01-01

## 2024-05-09 ENCOUNTER — APPOINTMENT (OUTPATIENT)
Dept: OBGYN | Facility: CLINIC | Age: 59
End: 2024-05-09
Payer: COMMERCIAL

## 2024-05-09 VITALS
HEART RATE: 94 BPM | HEIGHT: 68 IN | WEIGHT: 140 LBS | BODY MASS INDEX: 21.22 KG/M2 | DIASTOLIC BLOOD PRESSURE: 69 MMHG | SYSTOLIC BLOOD PRESSURE: 115 MMHG

## 2024-05-09 DIAGNOSIS — Z01.419 ENCOUNTER FOR GYNECOLOGICAL EXAMINATION (GENERAL) (ROUTINE) W/OUT ABNORMAL FINDINGS: ICD-10-CM

## 2024-05-09 LAB
BILIRUB UR QL STRIP: NORMAL
CLARITY UR: CLEAR
COLLECTION METHOD: NORMAL
GLUCOSE UR-MCNC: NORMAL
HCG UR QL: 0.2 EU/DL
HGB UR QL STRIP.AUTO: NORMAL
KETONES UR-MCNC: NORMAL
LEUKOCYTE ESTERASE UR QL STRIP: NORMAL
NITRITE UR QL STRIP: NORMAL
PH UR STRIP: 5
PROT UR STRIP-MCNC: NORMAL
SP GR UR STRIP: 1

## 2024-05-09 PROCEDURE — 99396 PREV VISIT EST AGE 40-64: CPT | Mod: 25

## 2024-05-09 PROCEDURE — 81003 URINALYSIS AUTO W/O SCOPE: CPT | Mod: QW

## 2024-05-13 LAB — CYTOLOGY CVX/VAG DOC THIN PREP: ABNORMAL

## 2024-05-16 ENCOUNTER — OUTPATIENT (OUTPATIENT)
Dept: OUTPATIENT SERVICES | Facility: HOSPITAL | Age: 59
LOS: 1 days | End: 2024-05-16
Payer: COMMERCIAL

## 2024-05-16 DIAGNOSIS — R92.8 OTHER ABNORMAL AND INCONCLUSIVE FINDINGS ON DIAGNOSTIC IMAGING OF BREAST: ICD-10-CM

## 2024-05-16 DIAGNOSIS — Z12.31 ENCOUNTER FOR SCREENING MAMMOGRAM FOR MALIGNANT NEOPLASM OF BREAST: ICD-10-CM

## 2024-05-16 LAB — HPV HIGH+LOW RISK DNA PNL CVX: NOT DETECTED

## 2024-05-16 PROCEDURE — 76642 ULTRASOUND BREAST LIMITED: CPT | Mod: LT

## 2024-05-16 PROCEDURE — G0279: CPT | Mod: 26

## 2024-05-16 PROCEDURE — 76642 ULTRASOUND BREAST LIMITED: CPT | Mod: 26,LT

## 2024-05-16 PROCEDURE — 77062 BREAST TOMOSYNTHESIS BI: CPT | Mod: 26

## 2024-05-16 PROCEDURE — 77066 DX MAMMO INCL CAD BI: CPT | Mod: 26

## 2024-05-16 PROCEDURE — G0279: CPT

## 2024-05-16 PROCEDURE — 77066 DX MAMMO INCL CAD BI: CPT

## 2024-05-17 DIAGNOSIS — R92.8 OTHER ABNORMAL AND INCONCLUSIVE FINDINGS ON DIAGNOSTIC IMAGING OF BREAST: ICD-10-CM

## 2024-09-03 ENCOUNTER — APPOINTMENT (OUTPATIENT)
Dept: NEUROLOGY | Facility: CLINIC | Age: 59
End: 2024-09-03

## 2024-10-23 ENCOUNTER — OUTPATIENT (OUTPATIENT)
Dept: OUTPATIENT SERVICES | Facility: HOSPITAL | Age: 59
LOS: 1 days | End: 2024-10-23
Payer: COMMERCIAL

## 2024-10-23 DIAGNOSIS — Z00.8 ENCOUNTER FOR OTHER GENERAL EXAMINATION: ICD-10-CM

## 2024-10-23 DIAGNOSIS — Z13.820 ENCOUNTER FOR SCREENING FOR OSTEOPOROSIS: ICD-10-CM

## 2024-10-23 PROCEDURE — 77080 DXA BONE DENSITY AXIAL: CPT | Mod: 26

## 2024-10-23 PROCEDURE — 77080 DXA BONE DENSITY AXIAL: CPT

## 2024-10-24 ENCOUNTER — APPOINTMENT (OUTPATIENT)
Dept: NEUROLOGY | Facility: CLINIC | Age: 59
End: 2024-10-24
Payer: COMMERCIAL

## 2024-10-24 VITALS
BODY MASS INDEX: 21.22 KG/M2 | WEIGHT: 140 LBS | OXYGEN SATURATION: 99 % | HEART RATE: 93 BPM | RESPIRATION RATE: 18 BRPM | HEIGHT: 68 IN | SYSTOLIC BLOOD PRESSURE: 120 MMHG | DIASTOLIC BLOOD PRESSURE: 62 MMHG

## 2024-10-24 DIAGNOSIS — G40.109 LOCALIZATION-RELATED (FOCAL) (PARTIAL) SYMPTOMATIC EPILEPSY AND EPILEPTIC SYNDROMES WITH SIMPLE PARTIAL SEIZURES, NOT INTRACTABLE, W/OUT STATUS EPILEPTICUS: ICD-10-CM

## 2024-10-24 DIAGNOSIS — Z13.820 ENCOUNTER FOR SCREENING FOR OSTEOPOROSIS: ICD-10-CM

## 2024-10-24 PROCEDURE — 95816 EEG AWAKE AND DROWSY: CPT

## 2024-10-24 PROCEDURE — 99213 OFFICE O/P EST LOW 20 MIN: CPT

## 2025-03-10 ENCOUNTER — RX RENEWAL (OUTPATIENT)
Age: 60
End: 2025-03-10

## 2025-05-07 ENCOUNTER — APPOINTMENT (OUTPATIENT)
Dept: NEUROLOGY | Facility: CLINIC | Age: 60
End: 2025-05-07
Payer: COMMERCIAL

## 2025-05-07 VITALS
HEART RATE: 109 BPM | HEIGHT: 68 IN | BODY MASS INDEX: 21.67 KG/M2 | DIASTOLIC BLOOD PRESSURE: 66 MMHG | RESPIRATION RATE: 22 BRPM | SYSTOLIC BLOOD PRESSURE: 114 MMHG | WEIGHT: 143 LBS | OXYGEN SATURATION: 99 %

## 2025-05-07 DIAGNOSIS — G40.109 LOCALIZATION-RELATED (FOCAL) (PARTIAL) SYMPTOMATIC EPILEPSY AND EPILEPTIC SYNDROMES WITH SIMPLE PARTIAL SEIZURES, NOT INTRACTABLE, W/OUT STATUS EPILEPTICUS: ICD-10-CM

## 2025-05-07 PROCEDURE — 99203 OFFICE O/P NEW LOW 30 MIN: CPT

## 2025-05-07 PROCEDURE — 99213 OFFICE O/P EST LOW 20 MIN: CPT

## 2025-05-12 ENCOUNTER — APPOINTMENT (OUTPATIENT)
Dept: OBGYN | Facility: CLINIC | Age: 60
End: 2025-05-12
Payer: COMMERCIAL

## 2025-05-12 VITALS
HEIGHT: 68 IN | WEIGHT: 142 LBS | SYSTOLIC BLOOD PRESSURE: 137 MMHG | BODY MASS INDEX: 21.52 KG/M2 | DIASTOLIC BLOOD PRESSURE: 83 MMHG | HEART RATE: 114 BPM

## 2025-05-12 DIAGNOSIS — Z01.419 ENCOUNTER FOR GYNECOLOGICAL EXAMINATION (GENERAL) (ROUTINE) W/OUT ABNORMAL FINDINGS: ICD-10-CM

## 2025-05-12 LAB
BILIRUB UR QL STRIP: NORMAL
CLARITY UR: CLEAR
COLLECTION METHOD: NORMAL
GLUCOSE UR-MCNC: NORMAL
HCG UR QL: 0.2 EU/DL
HGB UR QL STRIP.AUTO: NORMAL
KETONES UR-MCNC: NORMAL
LEUKOCYTE ESTERASE UR QL STRIP: NORMAL
NITRITE UR QL STRIP: NORMAL
PH UR STRIP: 7
PROT UR STRIP-MCNC: NORMAL
SP GR UR STRIP: 0.01

## 2025-05-12 PROCEDURE — 81003 URINALYSIS AUTO W/O SCOPE: CPT | Mod: QW

## 2025-05-12 PROCEDURE — 99396 PREV VISIT EST AGE 40-64: CPT | Mod: 25

## 2025-05-20 ENCOUNTER — OUTPATIENT (OUTPATIENT)
Dept: OUTPATIENT SERVICES | Facility: HOSPITAL | Age: 60
LOS: 1 days | End: 2025-05-20
Payer: COMMERCIAL

## 2025-05-20 DIAGNOSIS — Z12.31 ENCOUNTER FOR SCREENING MAMMOGRAM FOR MALIGNANT NEOPLASM OF BREAST: ICD-10-CM

## 2025-05-20 LAB — HPV HIGH+LOW RISK DNA PNL CVX: NOT DETECTED

## 2025-05-20 PROCEDURE — 77063 BREAST TOMOSYNTHESIS BI: CPT

## 2025-05-20 PROCEDURE — 77067 SCR MAMMO BI INCL CAD: CPT | Mod: 26

## 2025-05-20 PROCEDURE — 77067 SCR MAMMO BI INCL CAD: CPT

## 2025-05-20 PROCEDURE — 77063 BREAST TOMOSYNTHESIS BI: CPT | Mod: 26

## 2025-05-21 DIAGNOSIS — Z12.31 ENCOUNTER FOR SCREENING MAMMOGRAM FOR MALIGNANT NEOPLASM OF BREAST: ICD-10-CM

## 2025-06-11 ENCOUNTER — APPOINTMENT (OUTPATIENT)
Dept: ORTHOPEDIC SURGERY | Facility: CLINIC | Age: 60
End: 2025-06-11
Payer: COMMERCIAL

## 2025-06-11 ENCOUNTER — NON-APPOINTMENT (OUTPATIENT)
Age: 60
End: 2025-06-11

## 2025-06-11 VITALS — WEIGHT: 138 LBS | HEIGHT: 68 IN | BODY MASS INDEX: 20.92 KG/M2

## 2025-06-11 PROCEDURE — 99214 OFFICE O/P EST MOD 30 MIN: CPT | Mod: 25

## 2025-06-11 PROCEDURE — 73110 X-RAY EXAM OF WRIST: CPT | Mod: RT

## 2025-06-11 PROCEDURE — 29075 APPL CST ELBW FNGR SHORT ARM: CPT | Mod: 58,RT

## 2025-06-12 ENCOUNTER — APPOINTMENT (OUTPATIENT)
Dept: ORTHOPEDIC SURGERY | Facility: CLINIC | Age: 60
End: 2025-06-12

## 2025-06-16 ENCOUNTER — APPOINTMENT (OUTPATIENT)
Dept: ORTHOPEDIC SURGERY | Facility: CLINIC | Age: 60
End: 2025-06-16

## 2025-06-19 ENCOUNTER — APPOINTMENT (OUTPATIENT)
Dept: ORTHOPEDIC SURGERY | Facility: CLINIC | Age: 60
End: 2025-06-19
Payer: COMMERCIAL

## 2025-06-19 PROCEDURE — 73110 X-RAY EXAM OF WRIST: CPT | Mod: RT

## 2025-06-19 PROCEDURE — 99213 OFFICE O/P EST LOW 20 MIN: CPT

## 2025-06-25 ENCOUNTER — RESULT CHARGE (OUTPATIENT)
Age: 60
End: 2025-06-25

## 2025-06-25 ENCOUNTER — APPOINTMENT (OUTPATIENT)
Dept: ORTHOPEDIC SURGERY | Facility: CLINIC | Age: 60
End: 2025-06-25
Payer: COMMERCIAL

## 2025-06-25 PROCEDURE — 99213 OFFICE O/P EST LOW 20 MIN: CPT

## 2025-06-25 PROCEDURE — 73110 X-RAY EXAM OF WRIST: CPT | Mod: RT

## 2025-07-10 ENCOUNTER — APPOINTMENT (OUTPATIENT)
Dept: ORTHOPEDIC SURGERY | Facility: CLINIC | Age: 60
End: 2025-07-10

## 2025-07-10 PROCEDURE — 99213 OFFICE O/P EST LOW 20 MIN: CPT

## 2025-07-10 PROCEDURE — 73110 X-RAY EXAM OF WRIST: CPT | Mod: RT

## 2025-08-06 ENCOUNTER — APPOINTMENT (OUTPATIENT)
Dept: ORTHOPEDIC SURGERY | Facility: CLINIC | Age: 60
End: 2025-08-06
Payer: COMMERCIAL

## 2025-08-06 DIAGNOSIS — S52.591A OTHER FRACTURES OF LOWER END OF RIGHT RADIUS, INITIAL ENCOUNTER FOR CLOSED FRACTURE: ICD-10-CM

## 2025-08-06 PROCEDURE — 99213 OFFICE O/P EST LOW 20 MIN: CPT

## 2025-08-06 PROCEDURE — 73110 X-RAY EXAM OF WRIST: CPT | Mod: RT
